# Patient Record
Sex: FEMALE | Race: WHITE | NOT HISPANIC OR LATINO | Employment: OTHER | URBAN - NONMETROPOLITAN AREA
[De-identification: names, ages, dates, MRNs, and addresses within clinical notes are randomized per-mention and may not be internally consistent; named-entity substitution may affect disease eponyms.]

---

## 2017-10-26 ENCOUNTER — TRANSCRIBE ORDERS (OUTPATIENT)
Dept: ADMINISTRATIVE | Facility: HOSPITAL | Age: 72
End: 2017-10-26

## 2017-10-26 DIAGNOSIS — Z13.6 SCREENING FOR AAA (ABDOMINAL AORTIC ANEURYSM): Primary | ICD-10-CM

## 2017-10-26 DIAGNOSIS — Z78.0 POST-MENOPAUSAL: ICD-10-CM

## 2017-10-27 ENCOUNTER — TRANSCRIBE ORDERS (OUTPATIENT)
Dept: ULTRASOUND IMAGING | Facility: HOSPITAL | Age: 72
End: 2017-10-27

## 2017-10-27 DIAGNOSIS — Z13.6 ENCOUNTER FOR ABDOMINAL AORTIC ANEURYSM (AAA) SCREENING: Primary | ICD-10-CM

## 2017-10-30 ENCOUNTER — APPOINTMENT (OUTPATIENT)
Dept: BONE DENSITY | Facility: HOSPITAL | Age: 72
End: 2017-10-30

## 2017-10-30 ENCOUNTER — HOSPITAL ENCOUNTER (OUTPATIENT)
Dept: ULTRASOUND IMAGING | Facility: HOSPITAL | Age: 72
Discharge: HOME | End: 2017-10-30
Admitting: PHYSICIAN ASSISTANT

## 2017-10-30 DIAGNOSIS — Z13.6 ENCOUNTER FOR ABDOMINAL AORTIC ANEURYSM (AAA) SCREENING: ICD-10-CM

## 2017-10-30 DIAGNOSIS — Z78.0 POST-MENOPAUSAL: ICD-10-CM

## 2017-10-30 PROCEDURE — 77080 DXA BONE DENSITY AXIAL: CPT

## 2017-10-30 PROCEDURE — 76775 US EXAM ABDO BACK WALL LIM: CPT

## 2018-09-14 ENCOUNTER — TRANSCRIBE ORDERS (OUTPATIENT)
Dept: ADMINISTRATIVE | Facility: HOSPITAL | Age: 73
End: 2018-09-14

## 2018-09-14 DIAGNOSIS — N63.0 BREAST NODULE: Primary | ICD-10-CM

## 2018-10-08 ENCOUNTER — APPOINTMENT (OUTPATIENT)
Dept: ULTRASOUND IMAGING | Facility: HOSPITAL | Age: 73
End: 2018-10-08

## 2018-10-08 ENCOUNTER — APPOINTMENT (OUTPATIENT)
Dept: MAMMOGRAPHY | Facility: HOSPITAL | Age: 73
End: 2018-10-08

## 2018-10-09 ENCOUNTER — APPOINTMENT (OUTPATIENT)
Dept: ULTRASOUND IMAGING | Facility: HOSPITAL | Age: 73
End: 2018-10-09

## 2018-10-09 ENCOUNTER — APPOINTMENT (OUTPATIENT)
Dept: MAMMOGRAPHY | Facility: HOSPITAL | Age: 73
End: 2018-10-09

## 2018-11-09 ENCOUNTER — TRANSCRIBE ORDERS (OUTPATIENT)
Dept: ADMINISTRATIVE | Facility: HOSPITAL | Age: 73
End: 2018-11-09

## 2018-11-09 DIAGNOSIS — Z78.0 POST-MENOPAUSE: Primary | ICD-10-CM

## 2018-11-14 ENCOUNTER — APPOINTMENT (OUTPATIENT)
Dept: BONE DENSITY | Facility: HOSPITAL | Age: 73
End: 2018-11-14

## 2018-11-14 DIAGNOSIS — Z78.0 POST-MENOPAUSE: ICD-10-CM

## 2018-11-14 PROCEDURE — 77080 DXA BONE DENSITY AXIAL: CPT

## 2019-12-12 ENCOUNTER — TRANSCRIBE ORDERS (OUTPATIENT)
Dept: ULTRASOUND IMAGING | Facility: HOSPITAL | Age: 74
End: 2019-12-12

## 2019-12-12 DIAGNOSIS — R92.8 ABNORMAL BREAST THERMOGRAPHY: Primary | ICD-10-CM

## 2019-12-20 ENCOUNTER — HOSPITAL ENCOUNTER (OUTPATIENT)
Dept: ULTRASOUND IMAGING | Facility: HOSPITAL | Age: 74
Discharge: HOME | End: 2019-12-20
Admitting: PHYSICIAN ASSISTANT

## 2019-12-20 DIAGNOSIS — R92.8 ABNORMAL BREAST THERMOGRAPHY: ICD-10-CM

## 2019-12-20 PROCEDURE — 76641 ULTRASOUND BREAST COMPLETE: CPT

## 2021-02-23 ENCOUNTER — TRANSCRIBE ORDERS (OUTPATIENT)
Dept: ADMINISTRATIVE | Facility: HOSPITAL | Age: 76
End: 2021-02-23

## 2021-02-23 DIAGNOSIS — R10.9 ABDOMINAL PAIN, UNSPECIFIED ABDOMINAL LOCATION: ICD-10-CM

## 2021-02-23 DIAGNOSIS — Z78.0 POST-MENOPAUSAL: ICD-10-CM

## 2021-02-23 DIAGNOSIS — Z12.31 VISIT FOR SCREENING MAMMOGRAM: Primary | ICD-10-CM

## 2021-02-24 ENCOUNTER — TRANSCRIBE ORDERS (OUTPATIENT)
Dept: ADMINISTRATIVE | Facility: HOSPITAL | Age: 76
End: 2021-02-24

## 2021-02-24 DIAGNOSIS — Z12.39 ENCOUNTER FOR OTHER SCREENING FOR MALIGNANT NEOPLASM OF BREAST: Primary | ICD-10-CM

## 2021-02-25 ENCOUNTER — TRANSCRIBE ORDERS (OUTPATIENT)
Dept: ADMINISTRATIVE | Facility: HOSPITAL | Age: 76
End: 2021-02-25

## 2021-02-25 DIAGNOSIS — N60.09 SOLITARY CYST OF BREAST, UNSPECIFIED LATERALITY: Primary | ICD-10-CM

## 2021-02-25 DIAGNOSIS — R10.9 ABDOMINAL PAIN, UNSPECIFIED ABDOMINAL LOCATION: Primary | ICD-10-CM

## 2021-03-10 ENCOUNTER — APPOINTMENT (OUTPATIENT)
Dept: ULTRASOUND IMAGING | Facility: HOSPITAL | Age: 76
End: 2021-03-10

## 2021-03-11 ENCOUNTER — APPOINTMENT (OUTPATIENT)
Dept: BONE DENSITY | Facility: HOSPITAL | Age: 76
End: 2021-03-11

## 2021-03-11 ENCOUNTER — HOSPITAL ENCOUNTER (OUTPATIENT)
Dept: ULTRASOUND IMAGING | Facility: HOSPITAL | Age: 76
Discharge: HOME | End: 2021-03-11
Admitting: INTERNAL MEDICINE

## 2021-03-11 DIAGNOSIS — R10.9 ABDOMINAL PAIN, UNSPECIFIED ABDOMINAL LOCATION: ICD-10-CM

## 2021-03-11 PROCEDURE — 76700 US EXAM ABDOM COMPLETE: CPT

## 2021-03-23 ENCOUNTER — OFFICE VISIT (OUTPATIENT)
Dept: ORTHOPEDIC SURGERY | Facility: CLINIC | Age: 76
End: 2021-03-23

## 2021-03-23 VITALS — TEMPERATURE: 97.1 F | WEIGHT: 190 LBS | BODY MASS INDEX: 29.82 KG/M2 | HEIGHT: 67 IN

## 2021-03-23 DIAGNOSIS — M17.10 ARTHRITIS OF KNEE: ICD-10-CM

## 2021-03-23 DIAGNOSIS — M25.561 RIGHT KNEE PAIN, UNSPECIFIED CHRONICITY: Primary | ICD-10-CM

## 2021-03-23 PROCEDURE — 99203 OFFICE O/P NEW LOW 30 MIN: CPT | Performed by: ORTHOPAEDIC SURGERY

## 2021-03-23 NOTE — PROGRESS NOTES
"Subjective   Patient ID: Carolina Mei is a 75 y.o. female  Pain of the Right Knee (Referred by [facility] for roght knee pain started 3/18/21, patient states she stood up and felt something pull in the back of her knee. The next day, she stood up and felt a pop and felt like her knee was going to give out. Incr pain with weight-bearing)             History of Present Illness        Review of Systems   Constitutional: Negative for fever.   HENT: Negative for dental problem and voice change.    Eyes: Negative for visual disturbance.   Respiratory: Negative for shortness of breath.    Cardiovascular: Negative for chest pain.   Gastrointestinal: Negative for abdominal pain.   Genitourinary: Negative for dysuria.   Musculoskeletal: Positive for arthralgias and gait problem. Negative for joint swelling.   Skin: Negative for rash.   Neurological: Negative for speech difficulty.   Hematological: Does not bruise/bleed easily.   Psychiatric/Behavioral: Negative for confusion.       Past Medical History:   Diagnosis Date   • Disease of thyroid gland    • Hypertension         Past Surgical History:   Procedure Laterality Date   • AORTA SURGERY  2010   • BREAST SURGERY      breast biopsy, multiple   • CARDIAC SURGERY      open heart   • HYSTERECTOMY     • TONSILLECTOMY         Family History   Problem Relation Age of Onset   • Osteoporosis Mother        Social History     Socioeconomic History   • Marital status: Unknown     Spouse name: Not on file   • Number of children: Not on file   • Years of education: Not on file   • Highest education level: Not on file   Tobacco Use   • Smoking status: Never Smoker   • Smokeless tobacco: Never Used   Substance and Sexual Activity   • Alcohol use: Yes     Comment: 1 glass a wine a day       {Hx Review:45502::\"I have reviewed the medical and surgical history, family history, social history, medications, and/or allergies, and the review of systems of this report.\"}    Allergies   Allergen " "Reactions   • Amoxicillin Unknown (See Comments)     It has been awhile     • Boniva [Ibandronic Acid] Hives   • Darvon [Propoxyphene] Other (See Comments)     headache   • Erythromycin Hives   • Fruit Acid Concentrate [Fruit Extracts] Hives   • Hydrogen Peroxide Hives   • Percocet [Oxycodone-Acetaminophen] Other (See Comments)     Headache     • Quinine Derivatives Hives   • Shellfish-Derived Products GI Intolerance   • Statins Other (See Comments)     Aches and could not walk     • Keflex [Cephalexin] Hives         Current Outpatient Medications:   •  fluticasone (FLONASE) 50 MCG/ACT nasal spray, 1-2 sprays each nostril daily, Disp: 1 bottle, Rfl: 2  •  hydroxychloroquine (PLAQUENIL) 200 MG tablet, TAKE 1 TABLET BY MOUTH ONCE DAILY AS NEEDED FOR 5 DAYS, Disp: , Rfl:   •  levothyroxine (SYNTHROID, LEVOTHROID) 112 MCG tablet, , Disp: , Rfl:   •  metoprolol tartrate (LOPRESSOR) 50 MG tablet, , Disp: , Rfl:     Objective   Temp 97.1 °F (36.2 °C)   Ht 170.2 cm (67\")   Wt 86.2 kg (190 lb)   BMI 29.76 kg/m²    Physical Exam  Constitutional: Patient is oriented to person, place, and time. Patient appears well-developed and well-nourished.   HENT:Head: Normocephalic and atraumatic.   Eyes: EOM are normal. Pupils are equal, round, and reactive to light.   Neck: Normal range of motion. Neck supple.   Cardiovascular: Normal rate.    Pulmonary/Chest: Effort normal and breath sounds normal.   Abdominal: Soft.   Neurological: Patient is alert and oriented to person, place, and time.   Skin: Skin is warm and dry.   Psychiatric: Patient has a normal mood and affect.   Nursing note and vitals reviewed.       [unfilled]       Assessment/Plan   Review of Radiographic Studies:    {RN Imagin::\"No new imaging done today.\"}      Procedures     Diagnoses and all orders for this visit:    1. Right knee pain, unspecified chronicity (Primary)  -     XR Knee 1 or 2 View Right; Future  -     XR Knee 1 or 2 View Right; Future     " "  {16RN Patient Advice:86463::\"Physical therapy referral given\"}      Recommendations/Plan:   {16RN Recommendations/Plan:60107}    Patient agreeable to call or return sooner for any concerns.             Impression:    Plan:    "

## 2021-03-23 NOTE — PROGRESS NOTES
Subjective   Patient ID: Carolina Mei is a 75 y.o. female  Pain of the Right Knee (Referred by [facility] for roght knee pain started 3/18/21, patient states she stood up and felt something pull in the back of her knee. The next day, she stood up and felt a pop and felt like her knee was going to give out. Incr pain with weight-bearing)             History of Present Illness  75-year-old with onset of pain in the right knee on the 18th when she stood up felt a pop behind her knee the next morning she could hardly move it with pain has been on a walker since then although the pain is much improved last week.  Denies history of prior instability used to do a lot of ballroom dancing and was very active with her gardening over many years.      Review of Systems   Constitutional: Negative for diaphoresis, fever and unexpected weight change.   HENT: Negative for dental problem and sore throat.    Eyes: Negative for visual disturbance.   Respiratory: Negative for shortness of breath.    Cardiovascular: Negative for chest pain.   Gastrointestinal: Negative for abdominal pain, constipation, diarrhea, nausea and vomiting.   Genitourinary: Negative for difficulty urinating and frequency.   Musculoskeletal: Positive for arthralgias.   Neurological: Negative for headaches.   Hematological: Does not bruise/bleed easily.   All other systems reviewed and are negative.      Past Medical History:   Diagnosis Date   • Disease of thyroid gland    • Hypertension         Past Surgical History:   Procedure Laterality Date   • AORTA SURGERY  2010   • BREAST SURGERY      breast biopsy, multiple   • CARDIAC SURGERY      open heart   • HYSTERECTOMY     • TONSILLECTOMY         Family History   Problem Relation Age of Onset   • Osteoporosis Mother        Social History     Socioeconomic History   • Marital status: Unknown     Spouse name: Not on file   • Number of children: Not on file   • Years of education: Not on file   • Highest education  "level: Not on file   Tobacco Use   • Smoking status: Never Smoker   • Smokeless tobacco: Never Used   Substance and Sexual Activity   • Alcohol use: Yes     Comment: 1 glass a wine a day       I have reviewed the medical and surgical history, family history, social history, medications, and/or allergies, and the review of systems of this report.    Allergies   Allergen Reactions   • Amoxicillin Unknown (See Comments)     It has been awhile     • Boniva [Ibandronic Acid] Hives   • Darvon [Propoxyphene] Other (See Comments)     headache   • Erythromycin Hives   • Fruit Acid Concentrate [Fruit Extracts] Hives   • Hydrogen Peroxide Hives   • Percocet [Oxycodone-Acetaminophen] Other (See Comments)     Headache     • Quinine Derivatives Hives   • Shellfish-Derived Products GI Intolerance   • Statins Other (See Comments)     Aches and could not walk     • Keflex [Cephalexin] Hives         Current Outpatient Medications:   •  fluticasone (FLONASE) 50 MCG/ACT nasal spray, 1-2 sprays each nostril daily, Disp: 1 bottle, Rfl: 2  •  hydroxychloroquine (PLAQUENIL) 200 MG tablet, TAKE 1 TABLET BY MOUTH ONCE DAILY AS NEEDED FOR 5 DAYS, Disp: , Rfl:   •  levothyroxine (SYNTHROID, LEVOTHROID) 112 MCG tablet, , Disp: , Rfl:   •  metoprolol tartrate (LOPRESSOR) 50 MG tablet, , Disp: , Rfl:     Objective   Temp 97.1 °F (36.2 °C)   Ht 170.2 cm (67\")   Wt 86.2 kg (190 lb)   BMI 29.76 kg/m²    Physical Exam  Constitutional: Patient is oriented to person, place, and time. Patient appears well-developed and well-nourished.   HENT:Head: Normocephalic and atraumatic.   Eyes: EOM are normal. Pupils are equal, round, and reactive to light.   Neck: Normal range of motion. Neck supple.   Cardiovascular: Normal rate.    Pulmonary/Chest: Effort normal and breath sounds normal.   Abdominal: Soft.   Neurological: Patient is alert and oriented to person, place, and time.   Skin: Skin is warm and dry.   Psychiatric: Patient has a normal mood and " affect.   Nursing note and vitals reviewed.       [unfilled]   Right knee: Some tenderness over the posterior medial joint line mildly positive Evon's finding with flexion internal rotation, no ligamentous instability no ecchymosis calf supple extension is full no lateral joint line pain neurovascularly intact    Assessment/Plan   Review of Radiographic Studies:    Indication to evaluate joint condition, no comparison views available, shows evident chronic advanced osteoarthritis.      Procedures     Diagnoses and all orders for this visit:    1. Right knee pain, unspecified chronicity (Primary)  -     XR Knee 1 or 2 View Right; Future  -     XR Knee 1 or 2 View Right; Future    2. Arthritis of knee       Orthopedic activities reviewed and patient expressed appreciation, Risk, benefits, and merits of treatment alternatives reviewed with the patient and questions answered and Using illustrations and models, the nature of the pathology was explained to the patient      Recommendations/Plan:   Work/Activity Status: May perform usual activities as tolerated    Patient agreeable to call or return sooner for any concerns.         I reviewed the patient's radiographs indicating advanced osteoarthritis discussed the natural history treatment options and pros and cons and risks and complications of surgical and nonsurgical care.  I also reviewed advantages and disadvantages risks and complications of steroid injections versus viscus gel injections.  The patient had opportunity to ask questions which were answered.    Impression:  Right knee osteoarthritis with probable degenerative meniscal tear  Plan:  Observation icing progression activities if she does not improve to her satisfaction she will call in a month to schedule an MRI return to see me after MRI is complete

## 2021-04-29 ENCOUNTER — OFFICE VISIT (OUTPATIENT)
Dept: ORTHOPEDIC SURGERY | Facility: CLINIC | Age: 76
End: 2021-04-29

## 2021-04-29 VITALS — WEIGHT: 190 LBS | BODY MASS INDEX: 29.82 KG/M2 | HEIGHT: 67 IN | TEMPERATURE: 96.4 F

## 2021-04-29 DIAGNOSIS — M25.561 RIGHT KNEE PAIN, UNSPECIFIED CHRONICITY: Primary | ICD-10-CM

## 2021-04-29 DIAGNOSIS — M17.10 ARTHRITIS OF KNEE: ICD-10-CM

## 2021-04-29 PROCEDURE — 99213 OFFICE O/P EST LOW 20 MIN: CPT | Performed by: ORTHOPAEDIC SURGERY

## 2021-04-29 NOTE — PROGRESS NOTES
Subjective   Patient ID: Carolina Mie is a 75 y.o. female  Pain and Follow-up of the Right Knee (She has had really bad pain in knee for about a month but has improved some over the past few days. She states that daily activities really increased the pain such as grocery shopping and walking. )             History of Present Illness    She returns with intermittent pain in the right knee after prolonged activities such as gardening she finds her knee is very stiff painful at night she gets slight relief using Aspercreme topically and taking Aleve 1/2 tablet twice a day.  She would prefer not to have a cortisone injection.    Review of Systems   Constitutional: Negative for fever.   HENT: Negative for dental problem and voice change.    Eyes: Negative for visual disturbance.   Respiratory: Negative for shortness of breath.    Cardiovascular: Negative for chest pain.   Gastrointestinal: Negative for abdominal pain.   Genitourinary: Negative for dysuria.   Musculoskeletal: Positive for arthralgias and gait problem. Negative for joint swelling.   Skin: Negative for rash.   Neurological: Negative for speech difficulty.   Hematological: Does not bruise/bleed easily.   Psychiatric/Behavioral: Negative for confusion.       Past Medical History:   Diagnosis Date   • Disease of thyroid gland    • Hypertension         Past Surgical History:   Procedure Laterality Date   • AORTA SURGERY  2010   • BREAST SURGERY      breast biopsy, multiple   • CARDIAC SURGERY      open heart   • HYSTERECTOMY     • TONSILLECTOMY         Family History   Problem Relation Age of Onset   • Osteoporosis Mother        Social History     Socioeconomic History   • Marital status: Unknown     Spouse name: Not on file   • Number of children: Not on file   • Years of education: Not on file   • Highest education level: Not on file   Tobacco Use   • Smoking status: Never Smoker   • Smokeless tobacco: Never Used   Substance and Sexual Activity   • Alcohol  "use: Yes     Comment: 1 glass a wine a day       I have reviewed the medical and surgical history, family history, social history, medications, and/or allergies, and the review of systems of this report.    Allergies   Allergen Reactions   • Amoxicillin Unknown (See Comments)     It has been awhile     • Boniva [Ibandronic Acid] Hives   • Darvon [Propoxyphene] Other (See Comments)     headache   • Erythromycin Hives   • Fruit Acid Concentrate [Fruit Extracts] Hives   • Hydrogen Peroxide Hives   • Percocet [Oxycodone-Acetaminophen] Other (See Comments)     Headache     • Quinine Derivatives Hives   • Shellfish-Derived Products GI Intolerance   • Statins Other (See Comments)     Aches and could not walk     • Keflex [Cephalexin] Hives         Current Outpatient Medications:   •  fluticasone (FLONASE) 50 MCG/ACT nasal spray, 1-2 sprays each nostril daily, Disp: 1 bottle, Rfl: 2  •  hydroxychloroquine (PLAQUENIL) 200 MG tablet, TAKE 1 TABLET BY MOUTH ONCE DAILY AS NEEDED FOR 5 DAYS, Disp: , Rfl:   •  levothyroxine (SYNTHROID, LEVOTHROID) 112 MCG tablet, , Disp: , Rfl:   •  metoprolol tartrate (LOPRESSOR) 50 MG tablet, , Disp: , Rfl:     Objective   Temp 96.4 °F (35.8 °C)   Ht 170.2 cm (67\")   Wt 86.2 kg (190 lb)   BMI 29.76 kg/m²    Physical Exam  Constitutional: Patient is oriented to person, place, and time. Patient appears well-developed and well-nourished.   HENT:Head: Normocephalic and atraumatic.   Eyes: EOM are normal. Pupils are equal, round, and reactive to light.   Neck: Normal range of motion. Neck supple.   Cardiovascular: Normal rate.    Pulmonary/Chest: Effort normal and breath sounds normal.   Abdominal: Soft.   Neurological: Patient is alert and oriented to person, place, and time.   Skin: Skin is warm and dry.   Psychiatric: Patient has a normal mood and affect.   Nursing note and vitals reviewed.       [unfilled]   Right knee: Very trace effusion minimal warmth full extension very slight varus " alignment of 3 degrees, no sign of instability Evon sign negative for acute medial or lateral joint line pain.    Assessment/Plan   Review of Radiographic Studies:    No new imaging done today.      Procedures     Diagnoses and all orders for this visit:    1. Right knee pain, unspecified chronicity (Primary)    2. Arthritis of knee       Orthopedic activities reviewed and patient expressed appreciation and Risk, benefits, and merits of treatment alternatives reviewed with the patient and questions answered      Recommendations/Plan:   Work/Activity Status: May perform usual activities as tolerated    Patient agreeable to call or return sooner for any concerns.       I reviewed the patient's radiographs indicating advanced osteoarthritis discussed the natural history treatment options and pros and cons and risks and complications of surgical and nonsurgical care.  I also reviewed advantages and disadvantages risks and complications of steroid injections versus viscus gel injections.  The patient had opportunity to ask questions which were answered.      Impression:  Right knee osteoarthritis  Plan:  Return as needed

## 2021-05-27 ENCOUNTER — OFFICE VISIT (OUTPATIENT)
Dept: ORTHOPEDIC SURGERY | Facility: CLINIC | Age: 76
End: 2021-05-27

## 2021-05-27 DIAGNOSIS — M25.561 RIGHT KNEE PAIN, UNSPECIFIED CHRONICITY: Primary | ICD-10-CM

## 2021-05-27 DIAGNOSIS — M17.10 ARTHRITIS OF KNEE: ICD-10-CM

## 2021-05-27 PROCEDURE — 99213 OFFICE O/P EST LOW 20 MIN: CPT | Performed by: ORTHOPAEDIC SURGERY

## 2021-05-27 RX ORDER — ESTRADIOL 0.5 MG/1
TABLET ORAL
COMMUNITY
Start: 2021-05-05

## 2021-05-27 RX ORDER — MELOXICAM 15 MG/1
15 TABLET ORAL DAILY
Qty: 30 TABLET | Refills: 1 | OUTPATIENT
Start: 2021-05-27 | End: 2021-10-25

## 2021-05-27 NOTE — PROGRESS NOTES
Subjective   Patient ID: Carolina Mei is a 75 y.o. female  Follow-up, Pain, and Edema of the Right Knee (Follow up right knee pain, occasional swelling. She states the symptoms are persisting. She is concerned about weakness behind knee. )             History of Present Illness  She returns with continued intermittent burning in the right posterior knee that sometimes radiates down the leg and up to the hip she does have a history of degenerative disc disease in her lower back but denies recent fall injury or complaints of increasing lower back pain.  She has been doing a lot of gardening and is very adamantly opposed to having an injection or intervention treatment for her arthritis of her right knee.      Review of Systems   Constitutional: Negative for fever.   HENT: Negative for dental problem and voice change.    Eyes: Negative for visual disturbance.   Respiratory: Negative for shortness of breath.    Cardiovascular: Negative for chest pain.   Gastrointestinal: Negative for abdominal pain.   Genitourinary: Negative for dysuria.   Musculoskeletal: Positive for arthralgias and joint swelling. Negative for gait problem.   Skin: Negative for rash.   Neurological: Negative for speech difficulty.   Hematological: Does not bruise/bleed easily.   Psychiatric/Behavioral: Negative for confusion.       Past Medical History:   Diagnosis Date   • Disease of thyroid gland    • Hypertension         Past Surgical History:   Procedure Laterality Date   • AORTA SURGERY  2010   • BREAST SURGERY      breast biopsy, multiple   • CARDIAC SURGERY      open heart   • HYSTERECTOMY     • TONSILLECTOMY         Family History   Problem Relation Age of Onset   • Osteoporosis Mother        Social History     Socioeconomic History   • Marital status: Unknown     Spouse name: Not on file   • Number of children: Not on file   • Years of education: Not on file   • Highest education level: Not on file   Tobacco Use   • Smoking status: Never  Smoker   • Smokeless tobacco: Never Used   Substance and Sexual Activity   • Alcohol use: Yes     Comment: 1 glass a wine a day       I have reviewed the medical and surgical history, family history, social history, medications, and/or allergies, and the review of systems of this report.    Allergies   Allergen Reactions   • Amoxicillin Unknown (See Comments)     It has been awhile     • Boniva [Ibandronic Acid] Hives   • Darvon [Propoxyphene] Other (See Comments)     headache   • Erythromycin Hives   • Fruit Acid Concentrate [Fruit Extracts] Hives   • Hydrogen Peroxide Hives   • Percocet [Oxycodone-Acetaminophen] Other (See Comments)     Headache     • Quinine Derivatives Hives   • Shellfish-Derived Products GI Intolerance   • Statins Other (See Comments)     Aches and could not walk     • Keflex [Cephalexin] Hives         Current Outpatient Medications:   •  estradiol (ESTRACE) 0.5 MG tablet, , Disp: , Rfl:   •  fluticasone (FLONASE) 50 MCG/ACT nasal spray, 1-2 sprays each nostril daily, Disp: 1 bottle, Rfl: 2  •  hydroxychloroquine (PLAQUENIL) 200 MG tablet, TAKE 1 TABLET BY MOUTH ONCE DAILY AS NEEDED FOR 5 DAYS, Disp: , Rfl:   •  levothyroxine (SYNTHROID, LEVOTHROID) 112 MCG tablet, , Disp: , Rfl:   •  metoprolol tartrate (LOPRESSOR) 50 MG tablet, , Disp: , Rfl:   •  meloxicam (MOBIC) 15 MG tablet, Take 1 tablet by mouth Daily., Disp: 30 tablet, Rfl: 1    Objective   There were no vitals taken for this visit.   Physical Exam  Constitutional: Patient is oriented to person, place, and time. Patient appears well-developed and well-nourished.   HENT:Head: Normocephalic and atraumatic.   Eyes: EOM are normal. Pupils are equal, round, and reactive to light.   Neck: Normal range of motion. Neck supple.   Cardiovascular: Normal rate.    Pulmonary/Chest: Effort normal and breath sounds normal.   Abdominal: Soft.   Neurological: Patient is alert and oriented to person, place, and time.   Skin: Skin is warm and dry.    Psychiatric: Patient has a normal mood and affect.   Nursing note and vitals reviewed.       [unfilled]   Right knee: Some tenderness over the posterior lateral joint line Evon sign seems to reproduce pain in that area.  Straight leg raising also produces some sciatica-like distribution of pain down the leg posterior thigh posterior knee and posterior lateral lower leg.  Motor control intact throughout the right lower leg.    Assessment/Plan   Review of Radiographic Studies:    No new imaging done today.      Procedures     Diagnoses and all orders for this visit:    1. Right knee pain, unspecified chronicity (Primary)    2. Arthritis of knee    Other orders  -     meloxicam (MOBIC) 15 MG tablet; Take 1 tablet by mouth Daily.  Dispense: 30 tablet; Refill: 1       Orthopedic activities reviewed and patient expressed appreciation and Using illustrations and models, the nature of the pathology was explained to the patient      Recommendations/Plan:   Work/Activity Status: May perform usual activities as tolerated    Patient agreeable to call or return sooner for any concerns.             Impression:  Right knee osteoarthritis, symptoms suggestive right leg sciatica  Plan:  Observation topical Aspercreme trial of meloxicam follow-up with PCP if back and leg pain continue recommendation made for MR lumbar spine ordered by her PCP

## 2022-09-06 ENCOUNTER — TRANSCRIBE ORDERS (OUTPATIENT)
Dept: ADMINISTRATIVE | Facility: HOSPITAL | Age: 77
End: 2022-09-06

## 2022-09-06 DIAGNOSIS — R22.40 LOCALIZED SWELLING, MASS, OR LUMP OF LOWER EXTREMITY, UNSPECIFIED LATERALITY: Primary | ICD-10-CM

## 2022-10-07 ENCOUNTER — HOSPITAL ENCOUNTER (OUTPATIENT)
Dept: ULTRASOUND IMAGING | Facility: HOSPITAL | Age: 77
Discharge: HOME | End: 2022-10-07
Admitting: INTERNAL MEDICINE

## 2022-10-07 DIAGNOSIS — R22.40 LOCALIZED SWELLING, MASS, OR LUMP OF LOWER EXTREMITY, UNSPECIFIED LATERALITY: ICD-10-CM

## 2022-10-07 PROCEDURE — 76882 US LMTD JT/FCL EVL NVASC XTR: CPT

## 2025-02-18 ENCOUNTER — TELEPHONE (OUTPATIENT)
Age: 80
End: 2025-02-18
Payer: MEDICARE

## 2025-02-18 NOTE — TELEPHONE ENCOUNTER
Called patient to reschedule appointment. Patient advised she does not want to reschedule appointment at this time.